# Patient Record
Sex: FEMALE | Employment: OTHER | URBAN - METROPOLITAN AREA
[De-identification: names, ages, dates, MRNs, and addresses within clinical notes are randomized per-mention and may not be internally consistent; named-entity substitution may affect disease eponyms.]

---

## 2024-11-21 ENCOUNTER — PREPPED CHART (OUTPATIENT)
Dept: URBAN - METROPOLITAN AREA CLINIC 46 | Facility: CLINIC | Age: 64
End: 2024-11-21

## 2025-02-05 ENCOUNTER — APPOINTMENT (OUTPATIENT)
Dept: URBAN - METROPOLITAN AREA CLINIC 134 | Facility: CLINIC | Age: 65
Setting detail: DERMATOLOGY
End: 2025-02-05

## 2025-02-05 DIAGNOSIS — D22 MELANOCYTIC NEVI: ICD-10-CM

## 2025-02-05 DIAGNOSIS — L74.51 PRIMARY FOCAL HYPERHIDROSIS: ICD-10-CM | Status: INADEQUATELY CONTROLLED

## 2025-02-05 PROBLEM — D22.39 MELANOCYTIC NEVI OF OTHER PARTS OF FACE: Status: ACTIVE | Noted: 2025-02-05

## 2025-02-05 PROBLEM — L74.519 PRIMARY FOCAL HYPERHIDROSIS, UNSPECIFIED: Status: ACTIVE | Noted: 2025-02-05

## 2025-02-05 PROCEDURE — ? OTHER

## 2025-02-05 PROCEDURE — 99203 OFFICE O/P NEW LOW 30 MIN: CPT

## 2025-02-05 PROCEDURE — ? COUNSELING

## 2025-02-05 ASSESSMENT — LOCATION DETAILED DESCRIPTION DERM: LOCATION DETAILED: GLABELLA

## 2025-02-05 ASSESSMENT — LOCATION ZONE DERM: LOCATION ZONE: FACE

## 2025-02-05 ASSESSMENT — LOCATION SIMPLE DESCRIPTION DERM: LOCATION SIMPLE: GLABELLA

## 2025-02-05 NOTE — PROCEDURE: OTHER
Other (Free Text): Patient has an appointment with her pcp tomorrow at 2:30 pm to further discuss
Detail Level: Detailed
Render Risk Assessment In Note?: no

## 2025-07-01 ENCOUNTER — COMPREHENSIVE EXAM (OUTPATIENT)
Age: 65
End: 2025-07-01

## 2025-07-01 DIAGNOSIS — H40.023: ICD-10-CM

## 2025-07-01 DIAGNOSIS — Z98.890: ICD-10-CM

## 2025-07-01 DIAGNOSIS — H25.813: ICD-10-CM

## 2025-07-01 DIAGNOSIS — H04.123: ICD-10-CM

## 2025-07-01 DIAGNOSIS — H52.7: ICD-10-CM

## 2025-07-01 PROCEDURE — 92015 DETERMINE REFRACTIVE STATE: CPT

## 2025-07-01 PROCEDURE — 92004 COMPRE OPH EXAM NEW PT 1/>: CPT
